# Patient Record
Sex: MALE | Race: WHITE | Employment: FULL TIME | URBAN - METROPOLITAN AREA
[De-identification: names, ages, dates, MRNs, and addresses within clinical notes are randomized per-mention and may not be internally consistent; named-entity substitution may affect disease eponyms.]

---

## 2020-11-11 ENCOUNTER — TELEPHONE (OUTPATIENT)
Dept: FAMILY MEDICINE CLINIC | Facility: CLINIC | Age: 35
End: 2020-11-11

## 2020-11-11 NOTE — TELEPHONE ENCOUNTER
Darien almanza called she is a Pt of Dr. Yoly Schreiber. She is wanting to know  If Dr would establish care with him as a new patient? She also states that he has been having issue with gout and his foot is so bad that he has issues walking.

## 2021-01-08 ENCOUNTER — OFFICE VISIT (OUTPATIENT)
Dept: FAMILY MEDICINE CLINIC | Facility: CLINIC | Age: 36
End: 2021-01-08
Payer: COMMERCIAL

## 2021-01-08 VITALS
HEART RATE: 85 BPM | SYSTOLIC BLOOD PRESSURE: 140 MMHG | OXYGEN SATURATION: 97 % | TEMPERATURE: 98 F | BODY MASS INDEX: 40.14 KG/M2 | WEIGHT: 238 LBS | HEIGHT: 64.75 IN | DIASTOLIC BLOOD PRESSURE: 102 MMHG | RESPIRATION RATE: 16 BRPM

## 2021-01-08 DIAGNOSIS — Z00.00 ROUTINE HISTORY AND PHYSICAL EXAMINATION OF ADULT: Primary | ICD-10-CM

## 2021-01-08 DIAGNOSIS — R73.9 ELEVATED BLOOD SUGAR: ICD-10-CM

## 2021-01-08 DIAGNOSIS — R06.83 SNORING: ICD-10-CM

## 2021-01-08 DIAGNOSIS — R53.82 CHRONIC FATIGUE: ICD-10-CM

## 2021-01-08 DIAGNOSIS — R00.2 HEART PALPITATIONS: ICD-10-CM

## 2021-01-08 DIAGNOSIS — R74.8 ELEVATED LIVER ENZYMES: ICD-10-CM

## 2021-01-08 DIAGNOSIS — I10 ESSENTIAL HYPERTENSION: ICD-10-CM

## 2021-01-08 DIAGNOSIS — M25.551 RIGHT HIP PAIN: ICD-10-CM

## 2021-01-08 DIAGNOSIS — E66.9 OBESITY (BMI 30-39.9): ICD-10-CM

## 2021-01-08 DIAGNOSIS — Z23 NEED FOR VACCINATION: ICD-10-CM

## 2021-01-08 DIAGNOSIS — R07.9 CHEST PAIN, UNSPECIFIED TYPE: ICD-10-CM

## 2021-01-08 DIAGNOSIS — M10.079 IDIOPATHIC GOUT INVOLVING TOE, UNSPECIFIED CHRONICITY, UNSPECIFIED LATERALITY: ICD-10-CM

## 2021-01-08 LAB
ALBUMIN SERPL-MCNC: 4.1 G/DL (ref 3.4–5)
ALBUMIN/GLOB SERPL: 1.1 {RATIO} (ref 1–2)
ALP LIVER SERPL-CCNC: 57 U/L
ALT SERPL-CCNC: 82 U/L
ANION GAP SERPL CALC-SCNC: 2 MMOL/L (ref 0–18)
AST SERPL-CCNC: 42 U/L (ref 15–37)
BASOPHILS # BLD AUTO: 0.06 X10(3) UL (ref 0–0.2)
BASOPHILS NFR BLD AUTO: 0.6 %
BILIRUB SERPL-MCNC: 0.8 MG/DL (ref 0.1–2)
BUN BLD-MCNC: 13 MG/DL (ref 7–18)
BUN/CREAT SERPL: 13.8 (ref 10–20)
CALCIUM BLD-MCNC: 9 MG/DL (ref 8.5–10.1)
CHLORIDE SERPL-SCNC: 105 MMOL/L (ref 98–112)
CHOLEST SMN-MCNC: 197 MG/DL (ref ?–200)
CO2 SERPL-SCNC: 29 MMOL/L (ref 21–32)
CREAT BLD-MCNC: 0.94 MG/DL
DEPRECATED RDW RBC AUTO: 40.4 FL (ref 35.1–46.3)
EOSINOPHIL # BLD AUTO: 0.26 X10(3) UL (ref 0–0.7)
EOSINOPHIL NFR BLD AUTO: 2.6 %
ERYTHROCYTE [DISTWIDTH] IN BLOOD BY AUTOMATED COUNT: 12.1 % (ref 11–15)
GLOBULIN PLAS-MCNC: 3.9 G/DL (ref 2.8–4.4)
GLUCOSE BLD-MCNC: 81 MG/DL (ref 70–99)
HCT VFR BLD AUTO: 46.3 %
HDLC SERPL-MCNC: 55 MG/DL (ref 40–59)
HGB BLD-MCNC: 15.3 G/DL
IMM GRANULOCYTES # BLD AUTO: 0.02 X10(3) UL (ref 0–1)
IMM GRANULOCYTES NFR BLD: 0.2 %
LDLC SERPL CALC-MCNC: 114 MG/DL (ref ?–100)
LYMPHOCYTES # BLD AUTO: 2.84 X10(3) UL (ref 1–4)
LYMPHOCYTES NFR BLD AUTO: 28.7 %
M PROTEIN MFR SERPL ELPH: 8 G/DL (ref 6.4–8.2)
MCH RBC QN AUTO: 30.4 PG (ref 26–34)
MCHC RBC AUTO-ENTMCNC: 33 G/DL (ref 31–37)
MCV RBC AUTO: 91.9 FL
MONOCYTES # BLD AUTO: 0.63 X10(3) UL (ref 0.1–1)
MONOCYTES NFR BLD AUTO: 6.4 %
NEUTROPHILS # BLD AUTO: 6.07 X10 (3) UL (ref 1.5–7.7)
NEUTROPHILS # BLD AUTO: 6.07 X10(3) UL (ref 1.5–7.7)
NEUTROPHILS NFR BLD AUTO: 61.5 %
NONHDLC SERPL-MCNC: 142 MG/DL (ref ?–130)
OSMOLALITY SERPL CALC.SUM OF ELEC: 281 MOSM/KG (ref 275–295)
PATIENT FASTING Y/N/NP: NO
PATIENT FASTING Y/N/NP: NO
PLATELET # BLD AUTO: 203 10(3)UL (ref 150–450)
POTASSIUM SERPL-SCNC: 4.7 MMOL/L (ref 3.5–5.1)
RBC # BLD AUTO: 5.04 X10(6)UL
SODIUM SERPL-SCNC: 136 MMOL/L (ref 136–145)
TRIGL SERPL-MCNC: 138 MG/DL (ref 30–149)
TSI SER-ACNC: 0.75 MIU/ML (ref 0.36–3.74)
URATE SERPL-MCNC: 8.7 MG/DL
VLDLC SERPL CALC-MCNC: 28 MG/DL (ref 0–30)
WBC # BLD AUTO: 9.9 X10(3) UL (ref 4–11)

## 2021-01-08 PROCEDURE — 84550 ASSAY OF BLOOD/URIC ACID: CPT | Performed by: FAMILY MEDICINE

## 2021-01-08 PROCEDURE — 3008F BODY MASS INDEX DOCD: CPT | Performed by: FAMILY MEDICINE

## 2021-01-08 PROCEDURE — 93000 ELECTROCARDIOGRAM COMPLETE: CPT | Performed by: FAMILY MEDICINE

## 2021-01-08 PROCEDURE — 90471 IMMUNIZATION ADMIN: CPT | Performed by: FAMILY MEDICINE

## 2021-01-08 PROCEDURE — 3077F SYST BP >= 140 MM HG: CPT | Performed by: FAMILY MEDICINE

## 2021-01-08 PROCEDURE — 82043 UR ALBUMIN QUANTITATIVE: CPT | Performed by: FAMILY MEDICINE

## 2021-01-08 PROCEDURE — 36415 COLL VENOUS BLD VENIPUNCTURE: CPT | Performed by: FAMILY MEDICINE

## 2021-01-08 PROCEDURE — 82570 ASSAY OF URINE CREATININE: CPT | Performed by: FAMILY MEDICINE

## 2021-01-08 PROCEDURE — 80061 LIPID PANEL: CPT | Performed by: FAMILY MEDICINE

## 2021-01-08 PROCEDURE — 99385 PREV VISIT NEW AGE 18-39: CPT | Performed by: FAMILY MEDICINE

## 2021-01-08 PROCEDURE — 99203 OFFICE O/P NEW LOW 30 MIN: CPT | Performed by: FAMILY MEDICINE

## 2021-01-08 PROCEDURE — 80050 GENERAL HEALTH PANEL: CPT | Performed by: FAMILY MEDICINE

## 2021-01-08 PROCEDURE — 3080F DIAST BP >= 90 MM HG: CPT | Performed by: FAMILY MEDICINE

## 2021-01-08 PROCEDURE — 90686 IIV4 VACC NO PRSV 0.5 ML IM: CPT | Performed by: FAMILY MEDICINE

## 2021-01-08 PROCEDURE — 83036 HEMOGLOBIN GLYCOSYLATED A1C: CPT | Performed by: FAMILY MEDICINE

## 2021-01-08 RX ORDER — LISINOPRIL AND HYDROCHLOROTHIAZIDE 25; 20 MG/1; MG/1
TABLET ORAL
Qty: 90 TABLET | Refills: 0 | Status: SHIPPED | OUTPATIENT
Start: 2021-01-08 | End: 2021-05-17

## 2021-01-08 NOTE — PROGRESS NOTES
Flora Gil is a 38year old male who presents for a complete physical exam.   HPI:   Pt complains of a list of concerns. He really doesn't see doctors unless he \"has to\" but has had symptoms that concern him.   Has episods of heart beating rapid, th Take 1 tablet (100 mg total) by mouth daily for 14 days, THEN 2 tablets (200 mg total) daily for 14 days, THEN 3 tablets (300 mg total) daily for 2 days. 48 tablet 0   • allopurinol 300 MG Oral Tab Take 1 tablet (300 mg total) by mouth daily.  90 tablet 3 and throat are clear  EYES:PERRLA, EOMI, normal conjunctiva are clear  NECK: supple,no adenopathy,no JVD  LUNGS: clear to auscultation  CARDIO: RRR without murmur  GI: good BS's,no masses, HSM or tenderness  MUSCULOSKELETAL: back is not tender,FROM of the PANEL;  Future  -     TSH W REFLEX TO FREE T4; Future    Elevated blood sugar-based on home readings  -likely prediabetic; labs as ordered  -     VENIPUNCTURE  -     ELECTROCARDIOGRAM, COMPLETE  -     HEMOGLOBIN A1C; Future  -     MICROALB/CREAT RATIO, RAND episodes; may need holter and/or stress, start with labs, sleep study, BP management and see how he feels  -     VENIPUNCTURE  -     ELECTROCARDIOGRAM, COMPLETE  -     HEMOGLOBIN A1C; Future  -     CBC WITH DIFFERENTIAL WITH PLATELET;  Future  -     COMP ME

## 2021-01-09 LAB
CREAT UR-SCNC: 160 MG/DL
EST. AVERAGE GLUCOSE BLD GHB EST-MCNC: 131 MG/DL (ref 68–126)
HBA1C MFR BLD HPLC: 6.2 % (ref ?–5.7)
MICROALBUMIN UR-MCNC: 3.65 MG/DL
MICROALBUMIN/CREAT 24H UR-RTO: 22.8 UG/MG (ref ?–30)

## 2021-01-10 RX ORDER — ALLOPURINOL 100 MG/1
TABLET ORAL
Qty: 48 TABLET | Refills: 0 | Status: SHIPPED | OUTPATIENT
Start: 2021-01-10 | End: 2021-02-09

## 2021-01-10 RX ORDER — ALLOPURINOL 300 MG/1
300 TABLET ORAL DAILY
Qty: 90 TABLET | Refills: 3 | Status: SHIPPED | OUTPATIENT
Start: 2021-01-10 | End: 2021-05-17

## 2021-02-08 ENCOUNTER — TELEPHONE (OUTPATIENT)
Dept: FAMILY MEDICINE CLINIC | Facility: CLINIC | Age: 36
End: 2021-02-08

## 2021-02-08 NOTE — TELEPHONE ENCOUNTER
Overdue result letter mailed to patient   Lab Frequency Next Occurrence   CARD ECHO STRESS ECHO/REST AND STRESS(CPT=93350/40274 [facility] [place] [address]) Once 01/08/2021

## 2021-03-01 ENCOUNTER — PATIENT MESSAGE (OUTPATIENT)
Dept: FAMILY MEDICINE CLINIC | Facility: CLINIC | Age: 36
End: 2021-03-01

## 2021-03-01 ENCOUNTER — MED REC SCAN ONLY (OUTPATIENT)
Dept: FAMILY MEDICINE CLINIC | Facility: CLINIC | Age: 36
End: 2021-03-01

## 2021-04-09 DIAGNOSIS — Z23 NEED FOR VACCINATION: ICD-10-CM

## 2021-04-30 ENCOUNTER — MED REC SCAN ONLY (OUTPATIENT)
Dept: FAMILY MEDICINE CLINIC | Facility: CLINIC | Age: 36
End: 2021-04-30

## 2021-05-10 ENCOUNTER — TELEPHONE (OUTPATIENT)
Dept: FAMILY MEDICINE CLINIC | Facility: CLINIC | Age: 36
End: 2021-05-10

## 2021-05-17 ENCOUNTER — TELEPHONE (OUTPATIENT)
Dept: FAMILY MEDICINE CLINIC | Facility: CLINIC | Age: 36
End: 2021-05-17

## 2021-05-17 RX ORDER — ALLOPURINOL 300 MG/1
300 TABLET ORAL DAILY
Qty: 90 TABLET | Refills: 3 | Status: SHIPPED | OUTPATIENT
Start: 2021-05-17 | End: 2021-06-07

## 2021-05-17 RX ORDER — LISINOPRIL AND HYDROCHLOROTHIAZIDE 25; 20 MG/1; MG/1
1 TABLET ORAL DAILY
Qty: 90 TABLET | Refills: 0 | Status: SHIPPED | OUTPATIENT
Start: 2021-05-17 | End: 2021-06-07

## 2021-05-17 NOTE — TELEPHONE ENCOUNTER
PLEASE REVIEW MYCHART MESSAGES    LOV 1/8/2021  Last labs 1/8/2021  Last refill on 1/10/2021, for #90 tabs, with 3 refills  allopurinol 300 MG Oral Tab    Last refill on 1/10/2021, for #90 tabs, with 0 refills  Lisinopril-hydroCHLOROthiazide 20-25 MG Oral

## 2021-05-17 NOTE — TELEPHONE ENCOUNTER
From: Nabil Rivera  Sent: 5/17/2021 2:42 PM CDT  To: Bonita Gaspar Clinical Staff  Subject: RE: sleep study    Sorry for late response, yes they did, can't really tell if it's making a difference, not as tired during the day but still difficult to wear an

## 2021-05-17 NOTE — TELEPHONE ENCOUNTER
LM that patient will need to sign a release records form in order for [facility] to release records to us.

## 2021-05-17 NOTE — TELEPHONE ENCOUNTER
Pt scheduled on 6/7 for ER [facility] [place]) visit on 5/7 DX Diverticulitis. Please review/get records. Pt is not sure he told them to copy Dr. Reji Russell.    Any questions please call pt at [phone]

## 2021-05-18 ENCOUNTER — TELEPHONE (OUTPATIENT)
Dept: FAMILY MEDICINE CLINIC | Facility: CLINIC | Age: 36
End: 2021-05-18

## 2021-05-18 NOTE — TELEPHONE ENCOUNTER
Sent fax over to [facility] medical records to get records from 5/7 ER visit.

## 2021-06-07 ENCOUNTER — OFFICE VISIT (OUTPATIENT)
Dept: FAMILY MEDICINE CLINIC | Facility: CLINIC | Age: 36
End: 2021-06-07
Payer: COMMERCIAL

## 2021-06-07 VITALS
OXYGEN SATURATION: 98 % | WEIGHT: 242.25 LBS | DIASTOLIC BLOOD PRESSURE: 84 MMHG | TEMPERATURE: 98 F | BODY MASS INDEX: 41.36 KG/M2 | HEART RATE: 93 BPM | RESPIRATION RATE: 16 BRPM | HEIGHT: 64 IN | SYSTOLIC BLOOD PRESSURE: 132 MMHG

## 2021-06-07 DIAGNOSIS — R19.7 DIARRHEA, UNSPECIFIED TYPE: Primary | ICD-10-CM

## 2021-06-07 DIAGNOSIS — R35.0 URINARY FREQUENCY: ICD-10-CM

## 2021-06-07 DIAGNOSIS — I10 ESSENTIAL HYPERTENSION: ICD-10-CM

## 2021-06-07 DIAGNOSIS — R73.9 ELEVATED BLOOD SUGAR: ICD-10-CM

## 2021-06-07 DIAGNOSIS — K57.32 DIVERTICULITIS OF LARGE INTESTINE WITHOUT PERFORATION OR ABSCESS WITHOUT BLEEDING: ICD-10-CM

## 2021-06-07 DIAGNOSIS — M10.079 IDIOPATHIC GOUT INVOLVING TOE, UNSPECIFIED CHRONICITY, UNSPECIFIED LATERALITY: ICD-10-CM

## 2021-06-07 DIAGNOSIS — R74.8 ELEVATED LIVER ENZYMES: ICD-10-CM

## 2021-06-07 PROBLEM — E66.9 OBESITY, UNSPECIFIED: Status: ACTIVE | Noted: 2017-09-14

## 2021-06-07 PROCEDURE — 83036 HEMOGLOBIN GLYCOSYLATED A1C: CPT | Performed by: FAMILY MEDICINE

## 2021-06-07 PROCEDURE — 84153 ASSAY OF PSA TOTAL: CPT | Performed by: FAMILY MEDICINE

## 2021-06-07 PROCEDURE — 80053 COMPREHEN METABOLIC PANEL: CPT | Performed by: FAMILY MEDICINE

## 2021-06-07 PROCEDURE — 3008F BODY MASS INDEX DOCD: CPT | Performed by: FAMILY MEDICINE

## 2021-06-07 PROCEDURE — 99215 OFFICE O/P EST HI 40 MIN: CPT | Performed by: FAMILY MEDICINE

## 2021-06-07 PROCEDURE — 3079F DIAST BP 80-89 MM HG: CPT | Performed by: FAMILY MEDICINE

## 2021-06-07 PROCEDURE — 3075F SYST BP GE 130 - 139MM HG: CPT | Performed by: FAMILY MEDICINE

## 2021-06-07 PROCEDURE — 84550 ASSAY OF BLOOD/URIC ACID: CPT | Performed by: FAMILY MEDICINE

## 2021-06-07 RX ORDER — ALLOPURINOL 300 MG/1
300 TABLET ORAL DAILY
Qty: 90 TABLET | Refills: 3 | Status: SHIPPED | OUTPATIENT
Start: 2021-06-07 | End: 2021-08-30

## 2021-06-07 RX ORDER — LISINOPRIL AND HYDROCHLOROTHIAZIDE 25; 20 MG/1; MG/1
1 TABLET ORAL DAILY
Qty: 90 TABLET | Refills: 3 | Status: SHIPPED | OUTPATIENT
Start: 2021-06-07

## 2021-06-07 RX ORDER — MOXIFLOXACIN HYDROCHLORIDE 400 MG/1
400 TABLET ORAL DAILY
Qty: 10 TABLET | Refills: 0 | Status: SHIPPED | OUTPATIENT
Start: 2021-06-07 | End: 2021-06-17

## 2021-06-07 NOTE — PROGRESS NOTES
Vannessa Taveras is a 43year old male. HPI:   Pt is here for ER/UC/hospital f/u. ER/UC or hospital: [facility]    Date(s): 4/28/21    Reason for initial ER visit: severe LLQ adominal pain.   HAd been constipated at first so thought it might be that, took stoo Oral Tab Take 1 tablet (400 mg total) by mouth daily for 10 days. 10 tablet 0        HISTORY:  History reviewed. No pertinent past medical history. History reviewed. No pertinent surgical history.    Family History   Problem Relation Age of Onset   • Canc inflammation; treat with another round of abx; if doesn't resolve will order CT abd/pelvis with contrast; d/w patient will need cscope once this calms down  Urinary frequency  -chronic for 20 years; no obvious etiology; prostate normal size on CT scan;  HbA

## 2021-06-08 RX ORDER — ALLOPURINOL 100 MG/1
TABLET ORAL
Qty: 173 TABLET | Refills: 0 | Status: SHIPPED | OUTPATIENT
Start: 2021-06-08 | End: 2021-08-30

## 2021-06-09 ENCOUNTER — MED REC SCAN ONLY (OUTPATIENT)
Dept: FAMILY MEDICINE CLINIC | Facility: CLINIC | Age: 36
End: 2021-06-09

## 2021-06-13 ENCOUNTER — PATIENT MESSAGE (OUTPATIENT)
Dept: FAMILY MEDICINE CLINIC | Facility: CLINIC | Age: 36
End: 2021-06-13

## 2021-06-13 DIAGNOSIS — R73.03 PREDIABETES: Primary | ICD-10-CM

## 2021-06-13 DIAGNOSIS — R73.09 ELEVATED HEMOGLOBIN A1C: ICD-10-CM

## 2021-06-14 NOTE — TELEPHONE ENCOUNTER
From: Ryan Newell  To:  Marquita Gallardo MD  Sent: 6/13/2021 10:46 PM CDT  Subject: Referral Request    I would like a referral to a dietitian please, thank you

## 2021-08-30 RX ORDER — ALLOPURINOL 300 MG/1
600 TABLET ORAL DAILY
Qty: 180 TABLET | Refills: 0 | Status: SHIPPED | OUTPATIENT
Start: 2021-08-30

## 2021-08-30 NOTE — TELEPHONE ENCOUNTER
Routing to provider per protocol. Last refilled on 6/8/21 for # 173 with 0 rf. Last labs 6/7/21. Last seen on 6/7/21. No future appointments. Thank you.

## 2021-10-12 ENCOUNTER — TELEPHONE (OUTPATIENT)
Dept: FAMILY MEDICINE CLINIC | Facility: CLINIC | Age: 36
End: 2021-10-12

## 2022-04-20 ENCOUNTER — TELEPHONE (OUTPATIENT)
Dept: FAMILY MEDICINE CLINIC | Facility: CLINIC | Age: 37
End: 2022-04-20

## 2022-04-20 NOTE — TELEPHONE ENCOUNTER
See Jonnt message. Spouse stating there is a cyst at tail bone. It has been there for years but comes and goes. Right now, it is red and painful. Would like to know if he should come here or go to urgent care?     Please adv    Thank you

## 2022-04-20 NOTE — TELEPHONE ENCOUNTER
Discussed with Dr. Blank Hayes regarding note below - please send to [facility] - they are more equipped to perform incision and drainage of cyst    Patient advised of Doctor's note above. Pt advised to call office back if follow-up needed. Patient verbalized understanding. No further questions at this time.

## 2022-04-25 RX ORDER — LISINOPRIL AND HYDROCHLOROTHIAZIDE 25; 20 MG/1; MG/1
1 TABLET ORAL DAILY
Qty: 90 TABLET | Refills: 3 | OUTPATIENT
Start: 2022-04-25

## 2022-04-25 NOTE — TELEPHONE ENCOUNTER
Refill request too soon     Medication Quantity Refills Start End   Lisinopril-hydroCHLOROthiazide 20-25 MG Oral Tab 90 tablet 3 6/7/2021      Refill refused

## 2022-08-02 RX ORDER — ALLOPURINOL 300 MG/1
600 TABLET ORAL DAILY
Qty: 60 TABLET | Refills: 0 | Status: SHIPPED | OUTPATIENT
Start: 2022-08-02 | End: 2022-09-01

## 2022-08-02 NOTE — TELEPHONE ENCOUNTER
Please have him set up an appt to [facility] / follow up for HTN and medication refill. Will fill just for 1 month now.

## 2022-08-02 NOTE — TELEPHONE ENCOUNTER
LVM for pt advising 1 month refill sent and needing appt with MM. Ok per verbal release form, office number provided to call back to schedule.

## 2024-03-14 ENCOUNTER — OFFICE VISIT (OUTPATIENT)
Dept: FAMILY MEDICINE CLINIC | Facility: CLINIC | Age: 39
End: 2024-03-14
Payer: COMMERCIAL

## 2024-03-14 VITALS
HEIGHT: 64 IN | SYSTOLIC BLOOD PRESSURE: 130 MMHG | DIASTOLIC BLOOD PRESSURE: 80 MMHG | WEIGHT: 224.81 LBS | OXYGEN SATURATION: 99 % | HEART RATE: 81 BPM | RESPIRATION RATE: 16 BRPM | BODY MASS INDEX: 38.38 KG/M2 | TEMPERATURE: 99 F

## 2024-03-14 DIAGNOSIS — Z12.5 PROSTATE CANCER SCREENING: ICD-10-CM

## 2024-03-14 DIAGNOSIS — Z13.220 LIPID SCREENING: ICD-10-CM

## 2024-03-14 DIAGNOSIS — I10 ESSENTIAL HYPERTENSION: ICD-10-CM

## 2024-03-14 DIAGNOSIS — Z00.00 WELLNESS EXAMINATION: Primary | ICD-10-CM

## 2024-03-14 DIAGNOSIS — R73.09 ELEVATED GLUCOSE: ICD-10-CM

## 2024-03-14 LAB
ALBUMIN SERPL-MCNC: 4.3 G/DL (ref 3.4–5)
ALBUMIN/GLOB SERPL: 1.1 {RATIO} (ref 1–2)
ALP LIVER SERPL-CCNC: 52 U/L
ALT SERPL-CCNC: 41 U/L
ANION GAP SERPL CALC-SCNC: 4 MMOL/L (ref 0–18)
AST SERPL-CCNC: 27 U/L (ref 15–37)
BILIRUB SERPL-MCNC: 1.4 MG/DL (ref 0.1–2)
BUN BLD-MCNC: 17 MG/DL (ref 9–23)
CALCIUM BLD-MCNC: 9.5 MG/DL (ref 8.5–10.1)
CHLORIDE SERPL-SCNC: 104 MMOL/L (ref 98–112)
CHOLEST SERPL-MCNC: 187 MG/DL (ref ?–200)
CO2 SERPL-SCNC: 27 MMOL/L (ref 21–32)
COMPLEXED PSA SERPL-MCNC: 0.36 NG/ML (ref ?–4)
CREAT BLD-MCNC: 0.74 MG/DL
EGFRCR SERPLBLD CKD-EPI 2021: 117 ML/MIN/1.73M2 (ref 60–?)
EST. AVERAGE GLUCOSE BLD GHB EST-MCNC: 120 MG/DL (ref 68–126)
FASTING PATIENT LIPID ANSWER: YES
FASTING STATUS PATIENT QL REPORTED: YES
GLOBULIN PLAS-MCNC: 3.8 G/DL (ref 2.8–4.4)
GLUCOSE BLD-MCNC: 92 MG/DL (ref 70–99)
HBA1C MFR BLD: 5.8 % (ref ?–5.7)
HDLC SERPL-MCNC: 43 MG/DL (ref 40–59)
LDLC SERPL CALC-MCNC: 123 MG/DL (ref ?–100)
NONHDLC SERPL-MCNC: 144 MG/DL (ref ?–130)
OSMOLALITY SERPL CALC.SUM OF ELEC: 281 MOSM/KG (ref 275–295)
POTASSIUM SERPL-SCNC: 4.1 MMOL/L (ref 3.5–5.1)
PROT SERPL-MCNC: 8.1 G/DL (ref 6.4–8.2)
SODIUM SERPL-SCNC: 135 MMOL/L (ref 136–145)
TRIGL SERPL-MCNC: 115 MG/DL (ref 30–149)
VLDLC SERPL CALC-MCNC: 20 MG/DL (ref 0–30)

## 2024-03-14 PROCEDURE — 80053 COMPREHEN METABOLIC PANEL: CPT | Performed by: FAMILY MEDICINE

## 2024-03-14 PROCEDURE — 99396 PREV VISIT EST AGE 40-64: CPT | Performed by: FAMILY MEDICINE

## 2024-03-14 PROCEDURE — 84153 ASSAY OF PSA TOTAL: CPT | Performed by: FAMILY MEDICINE

## 2024-03-14 PROCEDURE — 83036 HEMOGLOBIN GLYCOSYLATED A1C: CPT | Performed by: FAMILY MEDICINE

## 2024-03-14 PROCEDURE — 90471 IMMUNIZATION ADMIN: CPT | Performed by: FAMILY MEDICINE

## 2024-03-14 PROCEDURE — 3079F DIAST BP 80-89 MM HG: CPT | Performed by: FAMILY MEDICINE

## 2024-03-14 PROCEDURE — 3008F BODY MASS INDEX DOCD: CPT | Performed by: FAMILY MEDICINE

## 2024-03-14 PROCEDURE — 3075F SYST BP GE 130 - 139MM HG: CPT | Performed by: FAMILY MEDICINE

## 2024-03-14 PROCEDURE — 99213 OFFICE O/P EST LOW 20 MIN: CPT | Performed by: FAMILY MEDICINE

## 2024-03-14 PROCEDURE — 80061 LIPID PANEL: CPT | Performed by: FAMILY MEDICINE

## 2024-03-14 PROCEDURE — 90715 TDAP VACCINE 7 YRS/> IM: CPT | Performed by: FAMILY MEDICINE

## 2024-03-14 RX ORDER — LISINOPRIL AND HYDROCHLOROTHIAZIDE 25; 20 MG/1; MG/1
1 TABLET ORAL DAILY
Qty: 90 TABLET | Refills: 3 | Status: SHIPPED | OUTPATIENT
Start: 2024-03-14

## 2024-03-14 RX ORDER — FLUTICASONE PROPIONATE 50 MCG
SPRAY, SUSPENSION (ML) NASAL DAILY
COMMUNITY

## 2024-03-14 RX ORDER — ALLOPURINOL 100 MG/1
100 TABLET ORAL DAILY
Qty: 90 TABLET | Refills: 0 | Status: SHIPPED | OUTPATIENT
Start: 2024-03-14

## 2024-03-14 RX ORDER — PROPRANOLOL HYDROCHLORIDE 20 MG/1
20 TABLET ORAL 3 TIMES DAILY PRN
Qty: 90 TABLET | Refills: 0 | Status: SHIPPED | OUTPATIENT
Start: 2024-03-14

## 2024-03-14 RX ORDER — LORATADINE 10 MG/1
10 TABLET ORAL DAILY
COMMUNITY

## 2024-03-14 RX ORDER — ALLOPURINOL 300 MG/1
600 TABLET ORAL DAILY
Qty: 90 TABLET | Refills: 3 | Status: CANCELLED | OUTPATIENT
Start: 2024-03-14

## 2024-03-14 RX ORDER — ALLOPURINOL 300 MG/1
600 TABLET ORAL DAILY
COMMUNITY
Start: 2021-08-30 | End: 2024-03-14

## 2024-03-14 NOTE — PROGRESS NOTES
Dipesh Portillo is a 38 year old male.   Chief Complaint   Patient presents with    Physical     Anxiety   Blood pressure       HPI:  38-year-old male who comes in for a wellness visit as well as to discuss other health problems.    Patient states that he has occasional anxiety that are clearly driven by certain situations that are pretty predictable and having to deal with his kids on a one-to-one basis or when has a particular activity or a certain chore.    Wondering if he can have some help with keeping his composure and his irritability well-controlled.    Patient states that his father passed in when he was in his 40s and is concerned about his heart health and wants to see if he can get any testing done.    Patient states that he occasionally feels a heart flutter that last for secondary to but always been passing.    Patient states that he has a history of high blood pressure for which he is on blood pressure medications and has been keeping track and has been averaging in the 120s to 130s over 80s.    Patient states that has a history of gout has not had a flare in over 5 years and wonders if he still needs to be on the medication.  Past Medical History:   Diagnosis Date    Essential hypertension     Gout        History reviewed. No pertinent surgical history.    Family History   Problem Relation Age of Onset    Cancer Mother 57        Brain    Heart Attack Father 42    Hypertension Father     Hypertension Brother     Other (Other) Brother         leg-calf-perthes dz    Diabetes Maternal Grandfather     Hypertension Maternal Grandfather     Cancer Paternal Grandfather     Other (Other) Half-Brother         Suicide       Social History:    Social History     Socioeconomic History    Marital status:    Tobacco Use    Smoking status: Former     Packs/day: 0.25     Years: 15.00     Additional pack years: 0.00     Total pack years: 3.75     Types: Cigarettes     Quit date: 1/1/2012     Years since quitting:  12.2    Smokeless tobacco: Current     Types: Chew   Vaping Use    Vaping Use: Never used   Substance and Sexual Activity    Alcohol use: Yes     Alcohol/week: 12.0 - 15.0 standard drinks of alcohol     Types: 12 - 15 Standard drinks or equivalent per week    Drug use: Not Currently       ALLERGIES:  No Known Allergies   CURRENT MEDS:  Current Outpatient Medications   Medication Sig Dispense Refill    lisinopril-hydroCHLOROthiazide 20-25 MG Oral Tab Take 1 tablet by mouth daily. 90 tablet 3    fluticasone propionate 50 MCG/ACT Nasal Suspension by Each Nare route daily.      loratadine 10 MG Oral Tab Take 1 tablet (10 mg total) by mouth daily.      allopurinol 100 MG Oral Tab Take 1 tablet (100 mg total) by mouth daily. 90 tablet 0    propranolol 20 MG Oral Tab Take 1 tablet (20 mg total) by mouth 3 (three) times daily as needed (anxiety). 90 tablet 0        REVIEW OF SYSTEMS:   GENERAL HEALTH: Feels well overall  SKIN: Denies any unusual skin lesions or rashes  EYES: No visual complaints or deficits  HEENT: Denies nasal congestion, sinus pain or sore throat; hearing loss negative  RESPIRATORY: Denies shortness of breath, wheezing or cough   CARDIOVASCULAR: Denies chest pain or GOODEN; no palpitations   GI: Denies nausea, vomiting, constipation, diarrhea; no rectal bleeding; no heartburn  GENITAL/: No urinary symptoms  MUSCULOSKELETAL: No joint complaints upper or lower extremities  NEURO: No sensory or motor complaint  PSYCHE: No symptoms of depression or anxiety  HEMATOLOGY: No easy bleeding, bruising,   ENDOCRINE: No thyroid symptoms      PHYSICAL EXAM:   GENERAL: Well developed, well nourished, in no apparent distress, A&O X 3  SKIN: No rashes, no suspicious lesions  EYES: PERRL, EOMI, sclera anicteric, conjunctiva normal  HEENT: Normocephalic; normal nose, pharynx and TM's  NECK: supple; Full range of motion , no JVD, thyroid not enlarged, no carotid bruits  RESPIRATORY: Bilaterally  clear to auscultation, no  rales, no wheezing, good A/E bilaterally  CARDIOVASCULAR: S1, S2 normal, RRR; no S3, no S4; no click; no murmur  ABDOMEN:  Soft, nondistended; no HSM; no masses; nontender, no guarding  GENITAL/: deferred   RECTAL:defered  LYMPHATIC: No lymphadenopathy  MUSCULOSKELETAL: Full painless ROM of U/E and L/E joints,no joint effusion  EXTREMITIES: No cyanosis, clubbing or edema, peripheral pulses intact  NEUROLOGIC:Motor power 5/5 all over, Cranial nerves 2-12: unremarcable; no sensory deficits  PSYCHIATRIC: Alert and oriented x 3; affect appropriate    ASSESSMENT/PLAN:     Diagnoses and all orders for this visit:    Wellness examination  -     Comp Metabolic Panel (14); Future  -     Lipid Panel; Future    Essential hypertension  -     lisinopril-hydroCHLOROthiazide 20-25 MG Oral Tab; Take 1 tablet by mouth daily.  -     OFFICE/OUTPT VISIT,EST,LEVL III  -     CT CALCIUM SCORING; Future    Elevated glucose  -     Hemoglobin A1C [E]; Future  -     OFFICE/OUTPT VISIT,EST,LEVL III    Lipid screening  -     Lipid Panel; Future  -     CT CALCIUM SCORING; Future    Prostate cancer screening  -     PSA Screen; Future    Other orders  -     TdaP (Adacel, Boostrix) [18532]  -     allopurinol 100 MG Oral Tab; Take 1 tablet (100 mg total) by mouth daily.  -     propranolol 20 MG Oral Tab; Take 1 tablet (20 mg total) by mouth 3 (three) times daily as needed (anxiety).      Discussed preventative test recommended fasting labs.  Patient is to have a coronary calcium score secondary to his obesity blood pressure and family history.  We decided to decrease his allopurinol from 300 mg daily to 100 mg daily and see if he does not have any flares.  For his irritability we decided to start him on propranolol up to 3 times a day as needed to use as needed and to let us know how that works for him.  Patient is due to have a Tdap today.       The patient verbalizes understanding of diagnosis, treatment plan and agrees to the plan of  care.

## 2024-04-08 RX ORDER — PROPRANOLOL HYDROCHLORIDE 20 MG/1
20 TABLET ORAL 3 TIMES DAILY PRN
Qty: 90 TABLET | Refills: 0 | Status: SHIPPED | OUTPATIENT
Start: 2024-04-08

## 2024-04-08 NOTE — TELEPHONE ENCOUNTER
LOV  3/14/24     Last Refill   propranolol 20 MG Oral Tab 90 tablet 0 3/14/2024       No future appointments.

## 2024-04-18 ENCOUNTER — TELEPHONE (OUTPATIENT)
Dept: FAMILY MEDICINE CLINIC | Facility: CLINIC | Age: 39
End: 2024-04-18

## 2024-06-06 RX ORDER — ALLOPURINOL 100 MG/1
100 TABLET ORAL DAILY
Qty: 90 TABLET | Refills: 0 | Status: SHIPPED | OUTPATIENT
Start: 2024-06-06

## 2024-06-06 NOTE — TELEPHONE ENCOUNTER
Routing to provider per protocol.   allopurinol 100 MG Oral Tab   Last refilled on 3/14/24 for #90  with 0 rf.   Last labs 3/14/24.   Last seen on 3/14/24.     No future appointments.       Thank you.

## 2024-09-06 RX ORDER — ALLOPURINOL 100 MG/1
100 TABLET ORAL DAILY
Qty: 90 TABLET | Refills: 0 | Status: SHIPPED | OUTPATIENT
Start: 2024-09-06

## 2024-09-06 NOTE — TELEPHONE ENCOUNTER
Routing to provider per protocol.   ALLOPURINOL 100 MG Oral Tab   Last refilled on 6/6/24 for #90  with 0 rf.   Last labs 3/14/24.   Last seen on 3/14/24.     No future appointments.       Thank you.

## 2024-12-06 RX ORDER — ALLOPURINOL 100 MG/1
100 TABLET ORAL DAILY
Qty: 90 TABLET | Refills: 0 | Status: SHIPPED | OUTPATIENT
Start: 2024-12-06

## 2024-12-06 NOTE — TELEPHONE ENCOUNTER
Routing to provider per protocol.   ALLOPURINOL 100 MG Oral Tab   Last refilled on 9/6/24 for #90  with 0 rf.   Last labs 3/14/24.   Last seen on 3/14/24.     No future appointments.       Thank you.

## 2025-03-08 NOTE — TELEPHONE ENCOUNTER
Pt failed refill protocol for the following reasons:  Requested Renewals     Name from pharmacy: ALLOPURINOL 100 MG TABLET         Will file in chart as: ALLOPURINOL 100 MG Oral Tab    Sig: TAKE 1 TABLET BY MOUTH EVERY DAY    Disp: 90 tablet    Refills: 0 (Pharmacy requested: Not specified)    Start: 3/8/2025    Class: Normal    Non-formulary    Last ordered: 3 months ago (12/6/2024) by Stanley Abdalla MD    Last refill: 12/6/2024    Rx #: [id number]       To be filled at: [facility]/pharmacy #[id number] - [address] [phone], [phone]            Last refill: 12/6/24  Last appt: 3/14/24  Next appt: No future appointments.      Forward to Dr. Ladd, please advise on refills. Thank you.

## 2025-03-10 RX ORDER — ALLOPURINOL 100 MG/1
100 TABLET ORAL DAILY
Qty: 30 TABLET | Refills: 0 | Status: SHIPPED | OUTPATIENT
Start: 2025-03-10

## 2025-04-06 DIAGNOSIS — I10 ESSENTIAL HYPERTENSION: ICD-10-CM

## 2025-04-07 ENCOUNTER — TELEPHONE (OUTPATIENT)
Dept: FAMILY MEDICINE CLINIC | Facility: CLINIC | Age: 40
End: 2025-04-07

## 2025-04-07 RX ORDER — LISINOPRIL AND HYDROCHLOROTHIAZIDE 20; 25 MG/1; MG/1
1 TABLET ORAL DAILY
Qty: 90 TABLET | Refills: 3 | OUTPATIENT
Start: 2025-04-07

## 2025-04-07 NOTE — TELEPHONE ENCOUNTER
Last OV 3/14/25  Last lab 3/14/25 cmp, 6/7/21 uric acid  Last refilled 3/10/25  #30  0 refill    Requesting #90

## 2025-04-07 NOTE — TELEPHONE ENCOUNTER
LISINOPRIL-HCTZ 20-25 MG TAB     Pt failed refill protocol for the following reasons:    Hypertension Medications Protocol [id number]/06/2025 06:51 AM   Protocol Details CMP or BMP in past 12 months    In person appointment or virtual visit in the past 12 mos or appointment in next 3 mos    EGFRCR or GFRNAA > 50    Last BP reading less than 140/90    Medication is active on med list      Last refill: 3/14/2024, for #90, 3 refillw  Last appt: 3/14/2024  Next appt: No future appointments.      Forward to Dr. Stanley Abdalla, please advise on refills. Thank you.

## 2025-04-08 RX ORDER — ALLOPURINOL 100 MG/1
100 TABLET ORAL DAILY
Qty: 90 TABLET | Refills: 0 | OUTPATIENT
Start: 2025-04-08

## 2025-04-09 RX ORDER — ALLOPURINOL 100 MG/1
100 TABLET ORAL DAILY
Qty: 90 TABLET | Refills: 0 | Status: CANCELLED | OUTPATIENT
Start: 2025-04-09 | End: 2025-07-08

## 2025-04-09 NOTE — TELEPHONE ENCOUNTER
Refused by: Stanley Parsons MD     Refusal reason: Appt required, please call patient       Patient due for wellness visit, please schedule and can address refill for 90 days.  Thank you

## 2025-04-17 ENCOUNTER — OFFICE VISIT (OUTPATIENT)
Dept: FAMILY MEDICINE CLINIC | Facility: CLINIC | Age: 40
End: 2025-04-17
Payer: COMMERCIAL

## 2025-04-17 VITALS
WEIGHT: 223 LBS | HEART RATE: 86 BPM | HEIGHT: 64 IN | TEMPERATURE: 98 F | DIASTOLIC BLOOD PRESSURE: 86 MMHG | OXYGEN SATURATION: 97 % | SYSTOLIC BLOOD PRESSURE: 118 MMHG | BODY MASS INDEX: 38.07 KG/M2 | RESPIRATION RATE: 16 BRPM

## 2025-04-17 DIAGNOSIS — E66.812 CLASS 2 SEVERE OBESITY WITH SERIOUS COMORBIDITY AND BODY MASS INDEX (BMI) OF 39.0 TO 39.9 IN ADULT, UNSPECIFIED OBESITY TYPE (HCC): ICD-10-CM

## 2025-04-17 DIAGNOSIS — R74.8 ELEVATED LIVER ENZYMES: ICD-10-CM

## 2025-04-17 DIAGNOSIS — I10 ESSENTIAL HYPERTENSION: ICD-10-CM

## 2025-04-17 DIAGNOSIS — Z00.00 WELLNESS EXAMINATION: Primary | ICD-10-CM

## 2025-04-17 DIAGNOSIS — Z12.5 PROSTATE CANCER SCREENING: ICD-10-CM

## 2025-04-17 DIAGNOSIS — R73.9 ELEVATED BLOOD SUGAR: ICD-10-CM

## 2025-04-17 DIAGNOSIS — E66.01 CLASS 2 SEVERE OBESITY WITH SERIOUS COMORBIDITY AND BODY MASS INDEX (BMI) OF 39.0 TO 39.9 IN ADULT, UNSPECIFIED OBESITY TYPE (HCC): ICD-10-CM

## 2025-04-17 DIAGNOSIS — Z13.220 LIPID SCREENING: ICD-10-CM

## 2025-04-17 LAB
ALBUMIN SERPL-MCNC: 5.2 G/DL (ref 3.2–4.8)
ALBUMIN/GLOB SERPL: 1.9 {RATIO} (ref 1–2)
ALP LIVER SERPL-CCNC: 49 U/L (ref 45–117)
ALT SERPL-CCNC: 38 U/L (ref 10–49)
ANION GAP SERPL CALC-SCNC: 10 MMOL/L (ref 0–18)
AST SERPL-CCNC: 34 U/L (ref ?–34)
BILIRUB SERPL-MCNC: 1.1 MG/DL (ref 0.3–1.2)
BUN BLD-MCNC: 12 MG/DL (ref 9–23)
CALCIUM BLD-MCNC: 9.7 MG/DL (ref 8.7–10.6)
CHLORIDE SERPL-SCNC: 105 MMOL/L (ref 98–112)
CHOLEST SERPL-MCNC: 163 MG/DL (ref ?–200)
CO2 SERPL-SCNC: 27 MMOL/L (ref 21–32)
COMPLEXED PSA SERPL-MCNC: 0.32 NG/ML (ref ?–4)
CREAT BLD-MCNC: 0.81 MG/DL (ref 0.7–1.3)
EGFRCR SERPLBLD CKD-EPI 2021: 113 ML/MIN/1.73M2 (ref 60–?)
EST. AVERAGE GLUCOSE BLD GHB EST-MCNC: 117 MG/DL (ref 68–126)
FASTING PATIENT LIPID ANSWER: YES
FASTING STATUS PATIENT QL REPORTED: YES
GLOBULIN PLAS-MCNC: 2.7 G/DL (ref 2–3.5)
GLUCOSE BLD-MCNC: 80 MG/DL (ref 70–99)
HBA1C MFR BLD: 5.7 % (ref ?–5.7)
HDLC SERPL-MCNC: 46 MG/DL (ref 40–59)
LDLC SERPL CALC-MCNC: 92 MG/DL (ref ?–100)
NONHDLC SERPL-MCNC: 117 MG/DL (ref ?–130)
OSMOLALITY SERPL CALC.SUM OF ELEC: 293 MOSM/KG (ref 275–295)
POTASSIUM SERPL-SCNC: 4.2 MMOL/L (ref 3.5–5.1)
PROT SERPL-MCNC: 7.9 G/DL (ref 5.7–8.2)
SODIUM SERPL-SCNC: 142 MMOL/L (ref 136–145)
TRIGL SERPL-MCNC: 144 MG/DL (ref 30–149)
VLDLC SERPL CALC-MCNC: 23 MG/DL (ref 0–30)

## 2025-04-17 PROCEDURE — 80053 COMPREHEN METABOLIC PANEL: CPT | Performed by: FAMILY MEDICINE

## 2025-04-17 PROCEDURE — 80061 LIPID PANEL: CPT | Performed by: FAMILY MEDICINE

## 2025-04-17 PROCEDURE — 83036 HEMOGLOBIN GLYCOSYLATED A1C: CPT | Performed by: FAMILY MEDICINE

## 2025-04-17 PROCEDURE — 84153 ASSAY OF PSA TOTAL: CPT | Performed by: FAMILY MEDICINE

## 2025-04-17 RX ORDER — LISINOPRIL AND HYDROCHLOROTHIAZIDE 20; 25 MG/1; MG/1
1 TABLET ORAL DAILY
Qty: 30 TABLET | Refills: 3 | Status: SHIPPED | OUTPATIENT
Start: 2025-04-17

## 2025-04-17 NOTE — PROGRESS NOTES
Dipesh Portillo is a 40 year old male.   Chief Complaint   Patient presents with    Physical     HPI:1.  Cpx: states that needs refills for meds, states that is wondering if dose is enough, no cp, sob or palpitations    Past Medical History[1]    Past Surgical History[2]    Family History[3]    Social History:  Short Social Hx on File[4]    ALLERGIES:  Allergies[5]   CURRENT MEDS:  Current Medications[6]     REVIEW OF SYSTEMS:   GENERAL HEALTH: Feels well overall  SKIN: Denies any unusual skin lesions or rashes  EYES: No visual complaints or deficits  HEENT: Denies nasal congestion, sinus pain or sore throat; hearing loss negative  RESPIRATORY: Denies shortness of breath, wheezing or cough   CARDIOVASCULAR: Denies chest pain or GOODEN; no palpitations   GI: Denies nausea, vomiting, constipation, diarrhea; no rectal bleeding; no heartburn  GENITAL/: No urinary symptoms  MUSCULOSKELETAL: No joint complaints upper or lower extremities  NEURO: No sensory or motor complaint  PSYCHE: No symptoms of depression or anxiety  HEMATOLOGY: No easy bleeding, bruising,   ENDOCRINE: No thyroid symptoms      PHYSICAL EXAM:   GENERAL: Well developed, well nourished, in no apparent distress, A&O X 3  SKIN: No rashes, no suspicious lesions  EYES: PERRL, EOMI, sclera anicteric, conjunctiva normal  HEENT: Normocephalic; normal nose, pharynx and TM's  NECK: supple; Full range of motion , no JVD, thyroid not enlarged, no carotid bruits  RESPIRATORY: Bilaterally  clear to auscultation, no rales, no wheezing, good A/E bilaterally  CARDIOVASCULAR: S1, S2 normal, RRR; no S3, no S4; no click; no murmur  ABDOMEN:  Soft, nondistended; no HSM; no masses; nontender, no guarding  GENITAL/: deferred   RECTAL:defered  LYMPHATIC: No lymphadenopathy  MUSCULOSKELETAL: Full painless ROM of U/E and L/E joints,no joint effusion  EXTREMITIES: No cyanosis, clubbing or edema, peripheral pulses intact  NEUROLOGIC:Motor power 5/5 all over, Cranial nerves 2-12:  unremarcable; no sensory deficits  PSYCHIATRIC: Alert and oriented x 3; affect appropriate    ASSESSMENT/PLAN:     Diagnoses and all orders for this visit:    Wellness examination  -     Comp Metabolic Panel (14); Future  -     Lipid Panel; Future    Lipid screening  -     Lipid Panel; Future    Prostate cancer screening  -     PSA Screen; Future    Essential hypertension  -     lisinopril-hydroCHLOROthiazide 20-25 MG Oral Tab; Take 1 tablet by mouth daily.    Elevated blood sugar  -     Hemoglobin A1C [E]; Future  -     [facility] [place] [facility]    Elevated liver enzymes    Class 2 severe obesity with serious comorbidity and body mass index (BMI) of 39.0 to 39.9 in adult, unspecified obesity type (HCC)  -     [facility] [place] [facility]      Discussed rpeventaitive care, fasting labs  To weight management' for obesity  Check A1c to rule out diabetes and liver check  Restar bp meds, call with update on reads in a month       The patient verbalizes understanding of diagnosis, treatment plan and agrees to the plan of care.           [1]   Past Medical History:   Essential hypertension    Gout   [2] No past surgical history on file.  [3]   Family History  Problem Relation Age of Onset    Cancer Mother 57        Brain    Heart Attack Father 42    Hypertension Father     Hypertension Brother     Other (Other) Brother         leg-calf-perthes dz    Diabetes Maternal Grandfather     Hypertension Maternal Grandfather     Cancer Paternal Grandfather     Other (Other) Half-Brother         Suicide   [4]   Social History  Socioeconomic History    Marital status:    Tobacco Use    Smoking status: Former     Current packs/day: 0.00     Average packs/day: 0.3 packs/day for 15.0 years (3.8 ttl pk-yrs)     Types: Cigarettes     Start date: 1997     Quit date: 2012     Years since quittin.3    Smokeless tobacco: Current     Types: Chew   Vaping Use    Vaping  status: Never Used   Substance and Sexual Activity    Alcohol use: Yes     Alcohol/week: 12.0 - 15.0 standard drinks of alcohol     Types: 12 - 15 Standard drinks or equivalent per week    Drug use: Not Currently     Social Drivers of Health     Food Insecurity: No Food Insecurity (4/17/2025)    NCSS - Food Insecurity     Worried About Running Out of Food in the Last Year: No     Ran Out of Food in the Last Year: No   Transportation Needs: No Transportation Needs (4/17/2025)    NCSS - Transportation     Lack of Transportation: No   Housing Stability: Not At Risk (4/17/2025)    NCSS - Housing/Utilities     Has Housing: Yes     Worried About Losing Housing: No     Unable to Get Utilities: No   [5] No Known Allergies  [6]   Current Outpatient Medications   Medication Sig Dispense Refill    lisinopril-hydroCHLOROthiazide 20-25 MG Oral Tab Take 1 tablet by mouth daily. 30 tablet 3    allopurinol 100 MG Oral Tab Take 1 tablet (100 mg total) by mouth daily. (Patient not taking: Reported on 4/17/2025) 30 tablet 0    propranolol 20 MG Oral Tab Take 1 tablet (20 mg total) by mouth 3 (three) times daily as needed (anxiety). (Patient not taking: Reported on 4/17/2025) 90 tablet 0    fluticasone propionate 50 MCG/ACT Nasal Suspension by Each Nare route daily. (Patient not taking: Reported on 4/17/2025)      loratadine 10 MG Oral Tab Take 1 tablet (10 mg total) by mouth daily. (Patient not taking: Reported on 4/17/2025)

## (undated) DIAGNOSIS — I10 ESSENTIAL HYPERTENSION: ICD-10-CM

## (undated) NOTE — LETTER
02/08/21          [address]           Dear Chan Sanchez     Our records indicate that you have outstanding lab work and or testing that was ordered for you and has not yet been completed:  Lab Frequency Next Occ

## (undated) NOTE — LETTER
Dipesh Portillo   [address] [facility] [address]           Dear Dipesh Portillo     Our records indicate that you have outstanding lab work and or testing that was ordered for you and has not yet been completed:  Lab Frequency Next Occurrence   CT CALCIUM SCORING Once 03/14/2024      To provide you with the best possible care, please complete these orders at your earliest convenience. If you have recently completed these orders please disregard this letter.     If you have any questions please call the office at [phone].     Thank you,     [facility]